# Patient Record
Sex: MALE | Race: BLACK OR AFRICAN AMERICAN | Employment: UNEMPLOYED | URBAN - METROPOLITAN AREA
[De-identification: names, ages, dates, MRNs, and addresses within clinical notes are randomized per-mention and may not be internally consistent; named-entity substitution may affect disease eponyms.]

---

## 2016-04-12 LAB — LDL-C, EXTERNAL: 105

## 2016-12-20 LAB — LDL-C, EXTERNAL: 127

## 2017-06-14 ENCOUNTER — OFFICE VISIT (OUTPATIENT)
Dept: UROLOGY | Age: 57
End: 2017-06-14

## 2017-06-14 VITALS
WEIGHT: 153 LBS | BODY MASS INDEX: 20.72 KG/M2 | DIASTOLIC BLOOD PRESSURE: 62 MMHG | HEIGHT: 72 IN | HEART RATE: 87 BPM | SYSTOLIC BLOOD PRESSURE: 98 MMHG | OXYGEN SATURATION: 98 %

## 2017-06-14 DIAGNOSIS — N52.02 CORPORO-VENOUS OCCLUSIVE ERECTILE DYSFUNCTION: ICD-10-CM

## 2017-06-14 DIAGNOSIS — C61 MALIGNANT NEOPLASM OF PROSTATE (HCC): Primary | ICD-10-CM

## 2017-06-14 LAB
BILIRUB UR QL STRIP: NEGATIVE
GLUCOSE UR-MCNC: NEGATIVE MG/DL
KETONES P FAST UR STRIP-MCNC: NEGATIVE MG/DL
PH UR STRIP: 5.5 [PH] (ref 4.6–8)
PROT UR QL STRIP: NEGATIVE MG/DL
SP GR UR STRIP: 1.02 (ref 1–1.03)
UA UROBILINOGEN AMB POC: NORMAL (ref 0.2–1)
URINALYSIS CLARITY POC: CLEAR
URINALYSIS COLOR POC: YELLOW
URINE BLOOD POC: NEGATIVE
URINE LEUKOCYTES POC: NEGATIVE
URINE NITRITES POC: NEGATIVE

## 2017-06-14 RX ORDER — TAMSULOSIN HYDROCHLORIDE 0.4 MG/1
CAPSULE ORAL
COMMUNITY
Start: 2017-05-03 | End: 2020-08-27 | Stop reason: ALTCHOICE

## 2017-06-14 RX ORDER — LOVASTATIN 20 MG/1
TABLET ORAL
COMMUNITY
Start: 2017-05-03

## 2017-06-14 RX ORDER — TADALAFIL 20 MG/1
20 TABLET ORAL AS NEEDED
Qty: 10 TAB | Refills: 10 | Status: SHIPPED | OUTPATIENT
Start: 2017-06-14

## 2017-06-14 NOTE — MR AVS SNAPSHOT
Visit Information Date & Time Provider Department Dept. Phone Encounter #  
 6/14/2017  9:30 AM Mae Beaulieu, Chris [place] [facility] [id number] Your Appointments 6/19/2017  9:15 AM  
[place] [facility] [facility]. [address] ([address]) Appt Note: Return in about 10 months (around 6/26/2017) for CaP s/p IMRT/brachytherapy 03/2016-06/2016, PSA prior. [address] [facility] [address] [id number]  
  
    
 6/27/2017  9:30 AM  
ESTABLISHED PATIENT with Lois Blackwood MD  
[facility]. [address] [facility]) Appt Note: Return in about 10 months (around 6/26/2017) for CaP s/p IMRT/brachytherapy 03/2016-06/2016, PSA prior. [address] [address]  
[phone]  
  
   
 [facility] [id number] Upcoming Health Maintenance Date Due Hepatitis C Screening 1960 Pneumococcal 19-64 Highest Risk (1 of 3 - PCV13) 3/21/1978 DTaP/Tdap/Td series (1 - Tdap) 3/21/1980 FOBT Q 1 YEAR AGE 50-75 3/21/2009 INFLUENZA AGE 9 TO ADULT 8/1/2017 Allergies as of 6/14/2017  Review Complete On: 6/14/2017 By: Mae Beaulieu MD  
 No Known Allergies Current Immunizations  Never Reviewed No immunizations on file. Not reviewed this visit You Were Diagnosed With   
  
 Codes Comments [facility]    -  Primary ICD-10-CM: V13 ICD-9-CM: 243 Vitals BP Pulse Height(growth percentile) Weight(growth percentile) SpO2 BMI  
 98/62 (BP 1 Location: Left arm, BP Patient Position: Sitting) 87 6' (1.829 m) 153 lb (69.4 kg) 98% 20.75 kg/m2 Smoking Status Current Some Day Smoker Vitals History BMI and BSA Data Body Mass Index Body Surface Area 20.75 kg/m 2 1.88 m 2 Preferred Pharmacy Pharmacy Name Phone [address] [facility], [address] Your Updated Medication List  
  
   
This list is accurate as of: 6/14/17 10:23 AM.  Always use your most recent med list.  
  
  
  
  
 lovastatin 20 mg tablet Commonly known as:  MEVACOR  
  
 sildenafil citrate 100 mg tablet Commonly known as:  VIAGRA Take 1 Tab by mouth daily as needed for up to 1 dose. Take one tablet po prior to relation on empty stomach. * tadalafil 20 mg tablet Commonly known as:  CIALIS Take 1 Tab by mouth daily as needed. * tadalafil 20 mg tablet Commonly known as:  CIALIS Take 1 Tab by mouth as needed. Indications: Erectile Dysfunction  
  
 tamsulosin 0.4 mg capsule Commonly known as:  FLOMAX * Notice: This list has 2 medication(s) that are the same as other medications prescribed for you. Read the directions carefully, and ask your doctor or other care provider to review them with you. Prescriptions Printed Refills  
 tadalafil (CIALIS) 20 mg tablet 10 Sig: Take 1 Tab by mouth as needed. Indications: Erectile Dysfunction Class: Print Route: Oral  
  
We Performed the Following AMB POC URINALYSIS DIP STICK AUTO W/O MICRO [23489 CPT(R)] ID COLLECTION VENOUS BLOOD,VENIPUNCTURE G5676349 CPT(R)] PROSTATE SPECIFIC AG (PSA) [id number] CPT(R)] Patient Instructions Prostate-Specific Antigen (PSA) Test: About This Test 
What is it? A prostate-specific antigen (PSA) test measures the amount of PSA in your blood. PSA is released by a man's prostate gland into his blood. A high PSA level may mean that you have an enlargement, infection, or cancer of the prostate. Why is this test done? You may have this test to: · Check for prostate cancer. · Watch prostate cancer and see if treatment is working.  
How can you prepare for the test? 
Do not ejaculate during the 2 days before your PSA blood test, either during sex or masturbation. What happens before the test? 
Tell your doctor if you have had a: 
· Test to look at your bladder (cystoscopy) in the past several weeks. · Prostate biopsy in the past several weeks. · Prostate infection or urinary tract infection that has not gone away. · Tube (catheter) inserted into your bladder to drain urine recently. What happens during the test? 
A health professional takes a sample of your blood. What happens after the test? 
You can go back to your usual activities right away. When should you call for help? Watch closely for changes in your health, and be sure to contact your doctor if you have any questions about this test. 
Follow-up care is a key part of your treatment and safety. Be sure to make and go to all appointments, and call your doctor if you are having problems. It's also a good idea to keep a list of the medicines you take. Ask your doctor when you can expect to have your test results. Where can you learn more? Go to http://ab-maximus.info/. Enter O922 in the search box to learn more about \"Prostate-Specific Antigen (PSA) Test: About This Test.\" Current as of: July 26, 2016 Content Version: 11.2 © 0811-3066 [employer]. Care instructions adapted under license by [employer] (which disclaims liability or warranty for this information). If you have questions about a medical condition or this instruction, always ask your healthcare professional. [place] [address] any warranty or liability for your use of this information. Introducing [facility]! Shoshana Anguiano introduces [employer] Path patient portal. Now you can access parts of your medical record, email your doctor's office, and request medication refills online. 1. In your internet browser, go to [email or web address] 2. Click on the First Time User? Click Here link in the Sign In box.  You will see the New Member Sign Up page. 3. Enter your LocBox Access Code exactly as it appears below. You will not need to use this code after youve completed the sign-up process. If you do not sign up before the expiration date, you must request a new code. · LocBox Access Code: [email or web address] Expires: 9/12/2017  9:25 AM 
 
4. Enter the last four digits of your Social Security Number (xxxx) and Date of Birth (mm/dd/yyyy) as indicated and click Submit. You will be taken to the next sign-up page. 5. Create a LocBox ID. This will be your LocBox login ID and cannot be changed, so think of one that is secure and easy to remember. 6. Create a LocBox password. You can change your password at any time. 7. Enter your Password Reset Question and Answer. This can be used at a later time if you forget your password. 8. Enter your e-mail address. You will receive e-mail notification when new information is available in [address]. 9. Click Sign Up. You can now view and download portions of your medical record. 10. Click the Download Summary menu link to download a portable copy of your medical information. If you have questions, please visit the Frequently Asked Questions section of the LocBox website. Remember, LocBox is NOT to be used for urgent needs. For medical emergencies, dial 911. Now available from your iPhone and Android! Please provide this summary of care documentation to your next provider. Your primary care clinician is listed as Fatmata Herring. If you have any questions after today's visit, please call [phone].

## 2017-06-14 NOTE — PROGRESS NOTES
RBV. Per Dr. Latrice Corey lab drawn in office today for PSA for prostate cancer. Mr. Tanisha Carrillo has a reminder for a \"due or due soon\" health maintenance. I have asked that he contact his primary care provider for follow-up on this health maintenance.

## 2017-06-14 NOTE — PATIENT INSTRUCTIONS
Prostate-Specific Antigen (PSA) Test: About This Test  What is it? A prostate-specific antigen (PSA) test measures the amount of PSA in your blood. PSA is released by a man's prostate gland into his blood. A high PSA level may mean that you have an enlargement, infection, or cancer of the prostate. Why is this test done? You may have this test to:  · Check for prostate cancer. · Watch prostate cancer and see if treatment is working. How can you prepare for the test?  Do not ejaculate during the 2 days before your PSA blood test, either during sex or masturbation. What happens before the test?  Tell your doctor if you have had a:  · Test to look at your bladder (cystoscopy) in the past several weeks. · Prostate biopsy in the past several weeks. · Prostate infection or urinary tract infection that has not gone away. · Tube (catheter) inserted into your bladder to drain urine recently. What happens during the test?  A health professional takes a sample of your blood. What happens after the test?  You can go back to your usual activities right away. When should you call for help? Watch closely for changes in your health, and be sure to contact your doctor if you have any questions about this test.  Follow-up care is a key part of your treatment and safety. Be sure to make and go to all appointments, and call your doctor if you are having problems. It's also a good idea to keep a list of the medicines you take. Ask your doctor when you can expect to have your test results. Where can you learn more? Go to http://ab-maximus.info/. Enter O675 in the search box to learn more about \"Prostate-Specific Antigen (PSA) Test: About This Test.\"  Current as of: July 26, 2016  Content Version: 11.2  © 7201-5425 [employer]. Care instructions adapted under license by [employer] (which disclaims liability or warranty for this information).  If you have questions about a medical condition or this instruction, always ask your healthcare professional. [place] [address] any warranty or liability for your use of this information.

## 2017-06-15 LAB — PSA SERPL-MCNC: 0.4 NG/ML (ref 0–4)
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
## 2017-12-06 ENCOUNTER — HOSPITAL ENCOUNTER (OUTPATIENT)
Dept: LAB | Age: 57
Discharge: HOME | End: 2017-12-06

## 2017-12-06 PROCEDURE — 99001 SPECIMEN HANDLING PT-LAB: CPT | Performed by: RADIOLOGY

## 2017-12-11 ENCOUNTER — HOSPITAL ENCOUNTER (OUTPATIENT)
Dept: RADIATION THERAPY | Age: 57
Discharge: HOME | End: 2017-12-11
Payer: MEDICAID

## 2017-12-11 PROCEDURE — 99211 OFF/OP EST MAY X REQ PHY/QHP: CPT

## 2018-02-13 ENCOUNTER — DOCUMENTATION ONLY (OUTPATIENT)
Dept: UROLOGY | Age: 58
End: 2018-02-13

## 2018-02-13 NOTE — PROGRESS NOTES
called and left message for me to call him. I returned the call no one answered left message for him to return his call.

## 2018-06-14 ENCOUNTER — OFFICE VISIT (OUTPATIENT)
Dept: UROLOGY | Age: 58
End: 2018-06-14

## 2018-06-14 VITALS
WEIGHT: 168 LBS | HEIGHT: 72 IN | BODY MASS INDEX: 22.75 KG/M2 | SYSTOLIC BLOOD PRESSURE: 118 MMHG | HEART RATE: 94 BPM | TEMPERATURE: 97.9 F | DIASTOLIC BLOOD PRESSURE: 60 MMHG | OXYGEN SATURATION: 98 %

## 2018-06-14 DIAGNOSIS — C61 MALIGNANT NEOPLASM OF PROSTATE (HCC): Primary | ICD-10-CM

## 2018-06-14 LAB
BILIRUB UR QL STRIP: NEGATIVE
GLUCOSE UR-MCNC: NEGATIVE MG/DL
KETONES P FAST UR STRIP-MCNC: NEGATIVE MG/DL
PH UR STRIP: 5 [PH] (ref 4.6–8)
PROT UR QL STRIP: NEGATIVE
SP GR UR STRIP: 1.02 (ref 1–1.03)
UA UROBILINOGEN AMB POC: NORMAL (ref 0.2–1)
URINALYSIS CLARITY POC: CLEAR
URINALYSIS COLOR POC: YELLOW
URINE BLOOD POC: NEGATIVE
URINE LEUKOCYTES POC: NEGATIVE
URINE NITRITES POC: NEGATIVE
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
## 2018-06-14 NOTE — PATIENT INSTRUCTIONS
Prostate Cancer: Care Instructions  Your Care Instructions    The prostate gland is a small, walnut-shaped organ that lies just below a man's bladder. It surrounds the urethra, the tube that carries urine from the bladder out of the body through the penis. Prostate cancer is the abnormal growth of cells in the prostate gland. Prostate cancer cells can spread within the prostate, to nearby lymph nodes and other tissues, and to other parts of the body. When the cancer hasn't spread outside the prostate, it is called localized prostate cancer. With localized prostate cancer, your options depend on how likely it is that your cancer will grow. Tests will show if your cancer is likely to grow. · Low-risk cancer isn't likely to grow right away. If your cancer is low-risk, you can choose active surveillance. This means your cancer will be watched closely by your doctor with regular checkups and tests to see if the cancer grows. This choice allows you to delay having surgery or radiation, often for many years. If the cancer grows very slowly, you may never need treatment. · Medium-risk cancer is more likely to grow. Some men with this type of cancer may be able to choose active surveillance. Others may need to choose surgery or radiation. · High-risk cancer is most likely to grow. If you have high-risk cancer, you will likely need to choose surgery or radiation. If your cancer has already spread outside the prostate or to other parts of the body, then you may have other treatments, like chemotherapy or hormone therapy. If you are over age [de-identified] or have other serious health problems, like heart disease, you may choose not to have treatments to cure your cancer. Instead, you can just have treatments to manage your symptoms. This is called watchful waiting. Finding out that you have cancer is scary. You may feel many emotions and may need some help coping. Seek out family, friends, and counselors for support.  You also can do things at home to make yourself feel better while you go through treatment. Call the Karin Morrissey (8-339.374.7688) or visit its website at [address] Source Audio for more information. Follow-up care is a key part of your treatment and safety. Be sure to make and go to all appointments, and call your doctor if you are having problems. It's also a good idea to know your test results and keep a list of the medicines you take. How can you care for yourself at home? · Your doctor will talk to you about your treatment options. You may need to learn more about each of them before you can decide which treatment is best for you. · Take your medicines exactly as prescribed. Call your doctor if you think you are having a problem with your medicine. You will get more details on the specific medicines your doctor prescribes. · Eat healthy food. If you do not feel like eating, try to eat food that has protein and extra calories to keep up your strength and prevent weight loss. Drink liquid meal replacements for extra calories and protein. Try to eat your main meal early. · Take steps to control your stress and workload. Learn relaxation techniques. ¨ Share your feelings. Stress and tension affect our emotions. By expressing your feelings to others, you may be able to understand and cope with them. ¨ Consider joining a support group. Talking about a problem with your spouse, a good friend, or other people with similar problems is a good way to reduce tension and stress. ¨ Express yourself through art. Try writing, crafts, dance, or art to relieve stress. Some dance, writing, or art groups may be available just for people who have cancer. ¨ Be kind to your body and mind. Getting enough sleep, eating a healthy diet, and taking time to do things you enjoy can contribute to an overall feeling of balance in your life and can help reduce stress. ¨ Get help if you need it.  Discuss your concerns with your doctor or counselor. · Get some physical activity every day, but do not get too tired. Keep doing the hobbies you enjoy as your energy allows. · If you are vomiting or have diarrhea:  ¨ Drink plenty of fluids (enough so that your urine is light yellow or clear like water) to prevent dehydration. Choose water and other caffeine-free clear liquids. If you have kidney, heart, or liver disease and have to limit fluids, talk with your doctor before you increase the amount of fluids you drink. ¨ When you are able to eat, try clear soups, mild foods, and liquids until all symptoms are gone for 12 to 48 hours. Jell-O, dry toast, crackers, and cooked cereal are also good choices. · If you have not already done so, prepare a list of advance directives. Advance directives are instructions to your doctor and family members about what kind of care you want if you become unable to speak or express yourself. When should you call for help? Call 911 anytime you think you may need emergency care. For example, call if:  ? · You passed out (lost consciousness). ?Call your doctor now or seek immediate medical care if:  ? · You have new or worse pain. ? · You have new symptoms, such as a cough, belly pain, vomiting, diarrhea, or a rash. ? · You have symptoms of a urinary tract infection. For example:  ¨ You have blood or pus in your urine. ¨ You have pain in your back just below your rib cage. This is called flank pain. ¨ You have a fever, chills, or body aches. ¨ It hurts to urinate. ¨ You have groin or belly pain. ? Watch closely for changes in your health, and be sure to contact your doctor if:  ? · You have swollen glands in your armpits, groin, or neck. ? · You have trouble controlling your urine. ? · You do not get better as expected. Where can you learn more? Go to http://ab-maximus.info/. Enter V141 in the search box to learn more about \"Prostate Cancer: Care Instructions. \"  Current as of:  May 12, 2017  Content Version: 11.4  © 1973-6843 Healthwise[employer]. Care instructions adapted under license by [employer] (which disclaims liability or warranty for this information). If you have questions about a medical condition or this instruction, always ask your healthcare professional. [address] any warranty or liability for your use of this information.

## 2018-06-14 NOTE — MR AVS SNAPSHOT
[address] [facility] [address] [address] 
[phone] Patient: Jared Stokes MRN: [id number] FDA:5/01/[birth year]42 Visit Information Date & Time Provider Department Dept. Phone Encounter #  
 6/14/2018  9:30 AM Rashida Mcdaniel, [address] [id number] Your Appointments 6/13/2019  9:45 AM  
Office Visit with Rashida Mcdaniel MD  
[facility] [address] [facility]) Appt Note: PSA  
 [address] [place] [address] [address]  
[phone] [address] [place] [address] Upcoming Health Maintenance Date Due Hepatitis C Screening 1960 Pneumococcal 19-64 Highest Risk (1 of 3 - PCV13) 3/21/1978 DTaP/Tdap/Td series (1 - Tdap) 3/21/1980 FOBT Q 1 YEAR AGE 50-75 3/21/2009 Influenza Age 5 to Adult 8/1/2018 Allergies as of 6/14/2018  Review Complete On: 6/14/2018 By: Rashida Mcdaniel MD  
 No Known Allergies Current Immunizations  Never Reviewed No immunizations on file. Not reviewed this visit You Were Diagnosed With   
  
 Codes Comments Malignant neoplasm of prostate ([facility]    -  Primary ICD-10-CM: U29 ICD-9-CM: 668 Vitals BP Pulse Temp Height(growth percentile) Weight(growth percentile) SpO2  
 118/60 (BP 1 Location: Left arm, BP Patient Position: Sitting) 94 97.9 °F (36.6 °C) (Oral) 6' (1.829 m) 168 lb (76.2 kg) 98% BMI Smoking Status 22.78 kg/m2 Current Some Day Smoker BMI and BSA Data Body Mass Index Body Surface Area 22.78 kg/m 2 1.97 m 2 Preferred Pharmacy Pharmacy Name Phone [address] [facility], [address] Your Updated Medication List  
  
   
This list is accurate as of 6/14/18 10:44 AM.  Always use your most recent med list.  
  
  
  
  
 lovastatin 20 mg tablet Commonly known as:  MEVACOR  
  
 sildenafil citrate 100 mg tablet Commonly known as:  VIAGRA Take 1 Tab by mouth daily as needed for up to 1 dose. Take one tablet po prior to relation on empty stomach. * tadalafil 20 mg tablet Commonly known as:  CIALIS Take 1 Tab by mouth daily as needed. * tadalafil 20 mg tablet Commonly known as:  CIALIS Take 1 Tab by mouth as needed. Indications: Erectile Dysfunction  
  
 tamsulosin 0.4 mg capsule Commonly known as:  FLOMAX * Notice: This list has 2 medication(s) that are the same as other medications prescribed for you. Read the directions carefully, and ask your doctor or other care provider to review them with you. We Performed the Following AMB POC URINALYSIS DIP STICK AUTO W/O MICRO [53017 CPT(R)] COLLECTION VENOUS BLOOD,VENIPUNCTURE [id number] CPT(R)] PSA, DIAGNOSTIC (PROSTATE SPECIFIC AG) [id number] CPT(R)] To-Do List   
 12/14/2018 10:00 AM  
  Appointment with [facility] RADIATION ONCOLOGY at [facility]ERAP[facility] ([phone]) ATTENTION: The Appointment Time in [address] may not reflect your scheduled appointment time as the time is only a place murphy. If you have any questions about your appointment time, please call [facility] / [address] [place] [address] at [phone]. Patient Instructions Prostate Cancer: Care Instructions Your Care Instructions The prostate gland is a small, walnut-shaped organ that lies just below a man's bladder. It surrounds the urethra, the tube that carries urine from the bladder out of the body through the penis. Prostate cancer is the abnormal growth of cells in the prostate gland. Prostate cancer cells can spread within the prostate, to nearby lymph nodes and other tissues, and to other parts of the body. When the cancer hasn't spread outside the prostate, it is called localized prostate cancer.  With localized prostate cancer, your options depend on how likely it is that your cancer will grow. Tests will show if your cancer is likely to grow. · Low-risk cancer isn't likely to grow right away. If your cancer is low-risk, you can choose active surveillance. This means your cancer will be watched closely by your doctor with regular checkups and tests to see if the cancer grows. This choice allows you to delay having surgery or radiation, often for many years. If the cancer grows very slowly, you may never need treatment. · Medium-risk cancer is more likely to grow. Some men with this type of cancer may be able to choose active surveillance. Others may need to choose surgery or radiation. · High-risk cancer is most likely to grow. If you have high-risk cancer, you will likely need to choose surgery or radiation. If your cancer has already spread outside the prostate or to other parts of the body, then you may have other treatments, like chemotherapy or hormone therapy. If you are over age [de-identified] or have other serious health problems, like heart disease, you may choose not to have treatments to cure your cancer. Instead, you can just have treatments to manage your symptoms. This is called watchful waiting. Finding out that you have cancer is scary. You may feel many emotions and may need some help coping. Seek out family, friends, and counselors for support. You also can do things at home to make yourself feel better while you go through treatment. Call the [facility] ([phone]) or visit its website at [address] [employer] for more information. Follow-up care is a key part of your treatment and safety. Be sure to make and go to all appointments, and call your doctor if you are having problems. It's also a good idea to know your test results and keep a list of the medicines you take. How can you care for yourself at home? · Your doctor will talk to you about your treatment options.  You may need to learn more about each of them before you can decide which treatment is best for you. · Take your medicines exactly as prescribed. Call your doctor if you think you are having a problem with your medicine. You will get more details on the specific medicines your doctor prescribes. · Eat healthy food. If you do not feel like eating, try to eat food that has protein and extra calories to keep up your strength and prevent weight loss. Drink liquid meal replacements for extra calories and protein. Try to eat your main meal early. · Take steps to control your stress and workload. Learn relaxation techniques. ¨ Share your feelings. Stress and tension affect our emotions. By expressing your feelings to others, you may be able to understand and cope with them. ¨ Consider joining a support group. Talking about a problem with your spouse, a good friend, or other people with similar problems is a good way to reduce tension and stress. ¨ Express yourself through art. Try writing, crafts, dance, or art to relieve stress. Some dance, writing, or art groups may be available just for people who have cancer. ¨ Be kind to your body and mind. Getting enough sleep, eating a healthy diet, and taking time to do things you enjoy can contribute to an overall feeling of balance in your life and can help reduce stress. ¨ Get help if you need it. Discuss your concerns with your doctor or counselor. · Get some physical activity every day, but do not get too tired. Keep doing the hobbies you enjoy as your energy allows. · If you are vomiting or have diarrhea: ¨ Drink plenty of fluids (enough so that your urine is light yellow or clear like water) to prevent dehydration. Choose water and other caffeine-free clear liquids. If you have kidney, heart, or liver disease and have to limit fluids, talk with your doctor before you increase the amount of fluids you drink. ¨ When you are able to eat, try clear soups, mild foods, and liquids until all symptoms are gone for 12 to 48 hours. Jell-O, dry toast, crackers, and cooked cereal are also good choices. · If you have not already done so, prepare a list of advance directives. Advance directives are instructions to your doctor and family members about what kind of care you want if you become unable to speak or express yourself. When should you call for help? Call 911 anytime you think you may need emergency care. For example, call if: 
? · You passed out (lost consciousness). ?Call your doctor now or seek immediate medical care if: 
? · You have new or worse pain. ? · You have new symptoms, such as a cough, belly pain, vomiting, diarrhea, or a rash. ? · You have symptoms of a urinary tract infection. For example: ¨ You have blood or pus in your urine. ¨ You have pain in your back just below your rib cage. This is called flank pain. ¨ You have a fever, chills, or body aches. ¨ It hurts to urinate. ¨ You have groin or belly pain. ? Watch closely for changes in your health, and be sure to contact your doctor if: 
? · You have swollen glands in your armpits, groin, or neck. ? · You have trouble controlling your urine. ? · You do not get better as expected. Where can you learn more? Go to http://ab-maximus.info/. Enter V141 in the search box to learn more about \"Prostate Cancer: Care Instructions. \" Current as of: May 12, 2017 Content Version: 11.4 © 4411-8317 [employer]. Care instructions adapted under license by [employer] (which disclaims liability or warranty for this information). If you have questions about a medical condition or this instruction, always ask your healthcare professional. [place] [address] any warranty or liability for your use of this information. Introducing [facility] SERVICES! Oliver Hadley introduces CupomNow patient portal. Now you can access parts of your medical record, email your doctor's office, and request medication refills online. 1. In your internet browser, go to [email or web address]. [email or web address]/Retail Info 2. Click on the First Time User? Click Here link in the Sign In box. You will see the New Member Sign Up page. 3. Enter your CupomNow Access Code exactly as it appears below. You will not need to use this code after youve completed the sign-up process. If you do not sign up before the expiration date, you must request a new code. · CupomNow Access Code: [email or web address] Expires: 9/12/2018 10:44 AM 
 
4. Enter the last four digits of your Social Security Number (xxxx) and Date of Birth (mm/dd/yyyy) as indicated and click Submit. You will be taken to the next sign-up page. 5. Create a CupomNow ID. This will be your CupomNow login ID and cannot be changed, so think of one that is secure and easy to remember. 6. Create a CupomNow password. You can change your password at any time. 7. Enter your Password Reset Question and Answer. This can be used at a later time if you forget your password. 8. Enter your e-mail address. You will receive e-mail notification when new information is available in [address] 9. Click Sign Up. You can now view and download portions of your medical record. 10. Click the Download Summary menu link to download a portable copy of your medical information. If you have questions, please visit the Frequently Asked Questions section of the CupomNow website. Remember, CupomNow is NOT to be used for urgent needs. For medical emergencies, dial 911. Now available from your iPhone and Android! Please provide this summary of care documentation to your next provider. Your primary care clinician is listed as Elizabeth Andrews. If you have any questions after today's visit, please call [phone].

## 2018-06-15 LAB — PSA SERPL-MCNC: <0.1 NG/ML (ref 0–4)

## 2018-08-30 ENCOUNTER — OFFICE VISIT (OUTPATIENT)
Dept: CARDIOLOGY CLINIC | Age: 58
End: 2018-08-30

## 2018-08-30 VITALS
HEART RATE: 89 BPM | HEIGHT: 72 IN | SYSTOLIC BLOOD PRESSURE: 109 MMHG | DIASTOLIC BLOOD PRESSURE: 61 MMHG | WEIGHT: 159 LBS | BODY MASS INDEX: 21.54 KG/M2

## 2018-08-30 DIAGNOSIS — R07.9 CHEST PAIN, UNSPECIFIED TYPE: Primary | ICD-10-CM

## 2018-08-30 DIAGNOSIS — R06.02 SHORTNESS OF BREATH: ICD-10-CM

## 2018-08-30 DIAGNOSIS — F17.200 SMOKER: ICD-10-CM

## 2018-08-30 DIAGNOSIS — E78.5 DYSLIPIDEMIA: ICD-10-CM

## 2018-08-30 NOTE — PATIENT INSTRUCTIONS
Chest Pain: Care Instructions  Your Care Instructions    There are many things that can cause chest pain. Some are not serious and will get better on their own in a few days. But some kinds of chest pain need more testing and treatment. Your doctor may have recommended a follow-up visit in the next 8 to 12 hours. If you are not getting better, you may need more tests or treatment. Even though your doctor has released you, you still need to watch for any problems. The doctor carefully checked you, but sometimes problems can develop later. If you have new symptoms or if your symptoms do not get better, get medical care right away. If you have worse or different chest pain or pressure that lasts more than 5 minutes or you passed out (lost consciousness), call 911 or seek other emergency help right away. A medical visit is only one step in your treatment. Even if you feel better, you still need to do what your doctor recommends, such as going to all suggested follow-up appointments and taking medicines exactly as directed. This will help you recover and help prevent future problems. How can you care for yourself at home? · Rest until you feel better. · Take your medicine exactly as prescribed. Call your doctor if you think you are having a problem with your medicine. · Do not drive after taking a prescription pain medicine. When should you call for help? Call 911 if:    · You passed out (lost consciousness).     · You have severe difficulty breathing.     · You have symptoms of a heart attack. These may include:  ¨ Chest pain or pressure, or a strange feeling in your chest.  ¨ Sweating. ¨ Shortness of breath. ¨ Nausea or vomiting. ¨ Pain, pressure, or a strange feeling in your back, neck, jaw, or upper belly or in one or both shoulders or arms. ¨ Lightheadedness or sudden weakness. ¨ A fast or irregular heartbeat.   After you call 911, the  may tell you to chew 1 adult-strength or 2 to 4 low-dose aspirin. Wait for an ambulance. Do not try to drive yourself.    Call your doctor today if:    · You have any trouble breathing.     · Your chest pain gets worse.     · You are dizzy or lightheaded, or you feel like you may faint.     · You are not getting better as expected.     · You are having new or different chest pain. Where can you learn more? Go to http://ab-maximus.info/. Enter A120 in the search box to learn more about \"Chest Pain: Care Instructions. \"  Current as of: November 20, 2017  Content Version: 11.7  © 1873-3201 [employer]. Care instructions adapted under license by [employer] (which disclaims liability or warranty for this information). If you have questions about a medical condition or this instruction, always ask your healthcare professional. [address] any warranty or liability for your use of this information. Chest Pain: Care Instructions  Your Care Instructions    There are many things that can cause chest pain. Some are not serious and will get better on their own in a few days. But some kinds of chest pain need more testing and treatment. Your doctor may have recommended a follow-up visit in the next 8 to 12 hours. If you are not getting better, you may need more tests or treatment. Even though your doctor has released you, you still need to watch for any problems. The doctor carefully checked you, but sometimes problems can develop later. If you have new symptoms or if your symptoms do not get better, get medical care right away. If you have worse or different chest pain or pressure that lasts more than 5 minutes or you passed out (lost consciousness), call 911 or seek other emergency help right away. A medical visit is only one step in your treatment.  Even if you feel better, you still need to do what your doctor recommends, such as going to all suggested follow-up appointments and taking medicines exactly as directed. This will help you recover and help prevent future problems. How can you care for yourself at home? · Rest until you feel better. · Take your medicine exactly as prescribed. Call your doctor if you think you are having a problem with your medicine. · Do not drive after taking a prescription pain medicine. When should you call for help? Call 911 if:    · You passed out (lost consciousness).     · You have severe difficulty breathing.     · You have symptoms of a heart attack. These may include:  ¨ Chest pain or pressure, or a strange feeling in your chest.  ¨ Sweating. ¨ Shortness of breath. ¨ Nausea or vomiting. ¨ Pain, pressure, or a strange feeling in your back, neck, jaw, or upper belly or in one or both shoulders or arms. ¨ Lightheadedness or sudden weakness. ¨ A fast or irregular heartbeat. After you call 911, the  may tell you to chew 1 adult-strength or 2 to 4 low-dose aspirin. Wait for an ambulance. Do not try to drive yourself.    Call your doctor today if:    · You have any trouble breathing.     · Your chest pain gets worse.     · You are dizzy or lightheaded, or you feel like you may faint.     · You are not getting better as expected.     · You are having new or different chest pain. Where can you learn more? Go to http://ab-maximus.info/. Enter A120 in the search box to learn more about \"Chest Pain: Care Instructions. \"  Current as of: November 20, 2017  Content Version: 11.7  © 9083-7120 [employer]. Care instructions adapted under license by [employer] (which disclaims liability or warranty for this information). If you have questions about a medical condition or this instruction, always ask your healthcare professional. [address] any warranty or liability for your use of this information.

## 2018-08-30 NOTE — MR AVS SNAPSHOT
Angelito Ogden 
 
 
 [address] [facility] 
[phone] Patient: Baylee Pitt MRN: [id number] BLZ:[id number]/31/[id number] Visit Information Date & Time Provider Department Dept. Phone Encounter #  
 8/30/2018  9:15 AM Tucker Mccarthy MD [facility] [phone] Follow-up Instructions Return in about 2 weeks (around 9/13/2018). Your Appointments 10/4/2018  1:30 PM  
PROCEDURE with CA ECHO [facility] ([address]) Appt Note: stress echo/yesenia; stress echo/yesenia [address] [facility] [address]  
  
   
 [address] [id number] [address] [address]  
  
    
 10/10/2018 11:30 AM  
Office Visit with Tucker Mccarthy MD  
[facility] ([facility]) Appt Note: Post stress echo  
 [address] [facility] [address]  
  
   
 [address] [id number] [address] [address] 6/13/2019  9:45 AM  
Office Visit with Cassandra Hodges MD  
[facility] [address] [place] [address]) Appt Note: PSA  
 [address] [facility] [address] [address]  
[phone] [address] [facility] [address] [place] [address] Upcoming Health Maintenance Date Due Hepatitis C Screening 1960 Pneumococcal 19-64 Highest Risk (1 of 3 - PCV13) 3/21/1978 DTaP/Tdap/Td series (1 - Tdap) 3/21/1980 FOBT Q 1 YEAR AGE 50-75 3/21/2009 Influenza Age 5 to Adult 8/1/2018 Allergies as of 8/30/2018  Review Complete On: 8/30/2018 By: Sinai Miranda No Known Allergies Current Immunizations  Never Reviewed No immunizations on file. Not reviewed this visit You Were Diagnosed With   
  
 Codes Comments Chest pain, unspecified type    -  Primary ICD-10-CM: R07.9 ICD-9-CM: 786.50 possible angina Shortness of breath     ICD-10-CM: R06.02 
ICD-9-CM: 786.05 Smoker     ICD-10-CM: U48.506 ICD-9-CM: 305.1 Dyslipidemia     ICD-10-CM: E78.5 ICD-9-CM: 272.4 Vitals BP Pulse Height(growth percentile) Weight(growth percentile) BMI Smoking Status 109/61 89 6' (1.829 m) 159 lb (72.1 kg) 21.56 kg/m2 Current Some Day Smoker Vitals History BMI and BSA Data Body Mass Index Body Surface Area  
 21.56 kg/m 2 1.91 m 2 Preferred Pharmacy Pharmacy Name Phone [address] [facility] [address] [facility], [address] [facility] Your Updated Medication List  
  
   
This list is accurate as of 8/30/18 10:32 AM.  Always use your most recent med list.  
  
  
  
  
 lovastatin 20 mg tablet Commonly known as:  MEVACOR  
  
 sildenafil citrate 100 mg tablet Commonly known as:  VIAGRA Take 1 Tab by mouth daily as needed for up to 1 dose. Take one tablet po prior to relation on empty stomach. tadalafil 20 mg tablet Commonly known as:  CIALIS Take 1 Tab by mouth as needed. Indications: Erectile Dysfunction  
  
 tamsulosin 0.4 mg capsule Commonly known as:  FLOMAX We Performed the Following AMB POC EKG ROUTINE W/ 12 LEADS, INTER & REP [08345 CPT(R)] Follow-up Instructions Return in about 2 weeks (around 9/13/2018). To-Do List   
 08/30/2018 Echocardiography:  ECHO STRESS   
  
 12/14/2018 10:00 AM  
  Appointment with [facility] at [facility] ([phone]) ATTENTION: The Appointment Time in [facility] may not reflect your scheduled appointment time as the time is only a place murphy. If you have any questions about your appointment time, please call [facility] / [address] [place] [address] at [phone]. Patient Instructions Chest Pain: Care Instructions Your Care Instructions There are many things that can cause chest pain. Some are not serious and will get better on their own in a few days.  But some kinds of chest pain need more testing and treatment. Your doctor may have recommended a follow-up visit in the next 8 to 12 hours. If you are not getting better, you may need more tests or treatment. Even though your doctor has released you, you still need to watch for any problems. The doctor carefully checked you, but sometimes problems can develop later. If you have new symptoms or if your symptoms do not get better, get medical care right away. If you have worse or different chest pain or pressure that lasts more than 5 minutes or you passed out (lost consciousness), call 911 or seek other emergency help right away. A medical visit is only one step in your treatment. Even if you feel better, you still need to do what your doctor recommends, such as going to all suggested follow-up appointments and taking medicines exactly as directed. This will help you recover and help prevent future problems. How can you care for yourself at home? · Rest until you feel better. · Take your medicine exactly as prescribed. Call your doctor if you think you are having a problem with your medicine. · Do not drive after taking a prescription pain medicine. When should you call for help? Call 911 if: 
  · You passed out (lost consciousness).  
  · You have severe difficulty breathing.  
  · You have symptoms of a heart attack. These may include: ¨ Chest pain or pressure, or a strange feeling in your chest. 
¨ Sweating. ¨ Shortness of breath. ¨ Nausea or vomiting. ¨ Pain, pressure, or a strange feeling in your back, neck, jaw, or upper belly or in one or both shoulders or arms. ¨ Lightheadedness or sudden weakness. ¨ A fast or irregular heartbeat. After you call 911, the  may tell you to chew 1 adult-strength or 2 to 4 low-dose aspirin. Wait for an ambulance. Do not try to drive yourself.  
 Call your doctor today if: 
  · You have any trouble breathing.  
  · Your chest pain gets worse.   · You are dizzy or lightheaded, or you feel like you may faint.  
  · You are not getting better as expected.  
  · You are having new or different chest pain. Where can you learn more? Go to http://ab-maximus.info/. Enter A120 in the search box to learn more about \"Chest Pain: Care Instructions. \" Current as of: November 20, 2017 Content Version: 11.7 © 4571-6233 [employer]. Care instructions adapted under license by [employer] (which disclaims liability or warranty for this information). If you have questions about a medical condition or this instruction, always ask your healthcare professional. [address] any warranty or liability for your use of this information. Chest Pain: Care Instructions Your Care Instructions There are many things that can cause chest pain. Some are not serious and will get better on their own in a few days. But some kinds of chest pain need more testing and treatment. Your doctor may have recommended a follow-up visit in the next 8 to 12 hours. If you are not getting better, you may need more tests or treatment. Even though your doctor has released you, you still need to watch for any problems. The doctor carefully checked you, but sometimes problems can develop later. If you have new symptoms or if your symptoms do not get better, get medical care right away. If you have worse or different chest pain or pressure that lasts more than 5 minutes or you passed out (lost consciousness), call 911 or seek other emergency help right away. A medical visit is only one step in your treatment. Even if you feel better, you still need to do what your doctor recommends, such as going to all suggested follow-up appointments and taking medicines exactly as directed. This will help you recover and help prevent future problems. How can you care for yourself at home? · Rest until you feel better. · Take your medicine exactly as prescribed. Call your doctor if you think you are having a problem with your medicine. · Do not drive after taking a prescription pain medicine. When should you call for help? Call 911 if: 
  · You passed out (lost consciousness).  
  · You have severe difficulty breathing.  
  · You have symptoms of a heart attack. These may include: ¨ Chest pain or pressure, or a strange feeling in your chest. 
¨ Sweating. ¨ Shortness of breath. ¨ Nausea or vomiting. ¨ Pain, pressure, or a strange feeling in your back, neck, jaw, or upper belly or in one or both shoulders or arms. ¨ Lightheadedness or sudden weakness. ¨ A fast or irregular heartbeat. After you call 911, the  may tell you to chew 1 adult-strength or 2 to 4 low-dose aspirin. Wait for an ambulance. Do not try to drive yourself.  
 Call your doctor today if: 
  · You have any trouble breathing.  
  · Your chest pain gets worse.  
  · You are dizzy or lightheaded, or you feel like you may faint.  
  · You are not getting better as expected.  
  · You are having new or different chest pain. Where can you learn more? Go to http://ab-maximus.info/. Enter A120 in the search box to learn more about \"Chest Pain: Care Instructions. \" Current as of: November 20, 2017 Content Version: 11.7 © 2446-1456 [employer]. Care instructions adapted under license by [employer] (which disclaims liability or warranty for this information). If you have questions about a medical condition or this instruction, always ask your healthcare professional. [address] any warranty or liability for your use of this information. Introducing [facility]! Alecia Zamarripa introduces [employer] patient portal. Now you can access parts of your medical record, email your doctor's office, and request medication refills online.    
 
1. In your internet browser, go to [email or web address]. [employer]/Silent Circlehart 2. Click on the First Time User? Click Here link in the Sign In box. You will see the New Member Sign Up page. 3. Enter your Zadby Access Code exactly as it appears below. You will not need to use this code after youve completed the sign-up process. If you do not sign up before the expiration date, you must request a new code. · Zadby Access Code: [email or web address] Expires: 9/12/2018 10:44 AM 
 
4. Enter the last four digits of your Social Security Number (xxxx) and Date of Birth (mm/dd/yyyy) as indicated and click Submit. You will be taken to the next sign-up page. 5. Create a Neurotrackt ID. This will be your Zadby login ID and cannot be changed, so think of one that is secure and easy to remember. 6. Create a Zadby password. You can change your password at any time. 7. Enter your Password Reset Question and Answer. This can be used at a later time if you forget your password. 8. Enter your e-mail address. You will receive e-mail notification when new information is available in [address] 9. Click Sign Up. You can now view and download portions of your medical record. 10. Click the Download Summary menu link to download a portable copy of your medical information. If you have questions, please visit the Frequently Asked Questions section of the Zadby website. Remember, Zadby is NOT to be used for urgent needs. For medical emergencies, dial 911. Now available from your iPhone and Android! Please provide this summary of care documentation to your next provider. Your primary care clinician is listed as Yannick Espinoza. If you have any questions after today's visit, please call [phone].
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
## 2018-12-14 ENCOUNTER — HOSPITAL ENCOUNTER (OUTPATIENT)
Dept: LAB | Age: 58
Discharge: HOME | End: 2018-12-14

## 2018-12-14 LAB — XX-LABCORP SPECIMEN COL,LCBCF: NORMAL

## 2018-12-14 PROCEDURE — 99001 SPECIMEN HANDLING PT-LAB: CPT

## 2018-12-19 ENCOUNTER — HOSPITAL ENCOUNTER (OUTPATIENT)
Dept: RADIATION THERAPY | Age: 58
Discharge: HOME | End: 2018-12-19
Payer: MEDICAID

## 2018-12-19 PROCEDURE — 99211 OFF/OP EST MAY X REQ PHY/QHP: CPT

## 2019-06-13 ENCOUNTER — OFFICE VISIT (OUTPATIENT)
Dept: UROLOGY | Age: 59
End: 2019-06-13

## 2019-06-13 VITALS
HEIGHT: 72 IN | SYSTOLIC BLOOD PRESSURE: 110 MMHG | DIASTOLIC BLOOD PRESSURE: 66 MMHG | HEART RATE: 113 BPM | OXYGEN SATURATION: 96 % | WEIGHT: 152 LBS | BODY MASS INDEX: 20.59 KG/M2

## 2019-06-13 DIAGNOSIS — N40.1 BPH WITH OBSTRUCTION/LOWER URINARY TRACT SYMPTOMS: ICD-10-CM

## 2019-06-13 DIAGNOSIS — C61 PROSTATE CANCER (HCC): Primary | ICD-10-CM

## 2019-06-13 DIAGNOSIS — N13.8 BPH WITH OBSTRUCTION/LOWER URINARY TRACT SYMPTOMS: ICD-10-CM

## 2019-06-13 LAB
BILIRUB UR QL STRIP: NEGATIVE
GLUCOSE UR-MCNC: NEGATIVE MG/DL
KETONES P FAST UR STRIP-MCNC: NEGATIVE MG/DL
PH UR STRIP: 5 [PH] (ref 4.6–8)
PROT UR QL STRIP: NEGATIVE
SP GR UR STRIP: 1.01 (ref 1–1.03)
UA UROBILINOGEN AMB POC: NORMAL (ref 0.2–1)
URINALYSIS CLARITY POC: CLEAR
URINALYSIS COLOR POC: YELLOW
URINE BLOOD POC: NEGATIVE
URINE LEUKOCYTES POC: NEGATIVE
URINE NITRITES POC: NEGATIVE

## 2019-06-13 RX ORDER — TAMSULOSIN HYDROCHLORIDE 0.4 MG/1
0.4 CAPSULE ORAL DAILY
Qty: 90 CAP | Refills: 3 | Status: SHIPPED | OUTPATIENT
Start: 2019-06-13

## 2019-06-13 NOTE — PATIENT INSTRUCTIONS
Prostate Cancer Screening: Care Instructions Your Care Instructions The prostate gland is an organ found just below a man's bladder. It is the size and shape of a walnut. It surrounds the tube that carries urine from the bladder out of the body through the penis. This tube is called the urethra. Prostate cancer is the abnormal growth of cells in the prostate. It is the second most common type of cancer in men. (Skin cancer is the most common.) Most cases of prostate cancer occur in men older than 72. The disease runs in families. And it's more common in -American men. When it's found and treated early, prostate cancer may be cured. But it is not always treated. This is because prostate cancer may not shorten your life, especially if you are older and the cancer is growing slowly. Follow-up care is a key part of your treatment and safety. Be sure to make and go to all appointments, and call your doctor if you are having problems. It's also a good idea to know your test results and keep a list of the medicines you take. What are the screening tests for prostate cancer? The main screening test for prostate cancer is the prostate-specific antigen (PSA) test. This is a blood test that measures how much PSA is in your blood. A high level may mean that you have an enlargement, an infection, or cancer. Along with the PSA test, you may have a digital rectal exam. The digital (finger) rectal exam checks for anything abnormal in your prostate. To do the exam, the doctor puts a lubricated, gloved finger into your rectum. If these tests suggest cancer, you may need a prostate biopsy. How is prostate cancer diagnosed? In a biopsy, the doctor takes small tissue samples from your prostate gland. Another doctor then looks at the tissue under a microscope to see if there are cancer cells, signs of infection, or other problems. The results help diagnose prostate cancer. What are the pros and cons of screening? Neither a PSA test nor a digital rectal exam can tell you for sure that you do or do not have cancer. But they can help you decide if you need more tests, such as a prostate biopsy. Screening tests may be useful because most men with prostate cancer don't have symptoms. It can be hard to know if you have cancer until it is more advanced. And then it's harder to treat. But having a PSA test can also cause harm. The test may show high levels of PSA that aren't caused by cancer. So you could have a prostate biopsy you didn't need. Or the PSA test might be normal when there is cancer, so a cancer might not be found early. The test can also find cancers that would never have caused a problem during your lifetime. So you might have treatment that was not needed. Prostate cancer usually develops late in life and grows slowly. For many men, it does not shorten their lives. Some experts advise screening only for men who are at high risk. Talk with your doctor to see if screening is right for you. Where can you learn more? Go to http://ab-maximus.info/. Enter R550 in the search box to learn more about \"Prostate Cancer Screening: Care Instructions. \" Current as of: March 27, 2018 Content Version: 11.9 © 7021-8097 [employer]. Care instructions adapted under license by [employer] (which disclaims liability or warranty for this information). If you have questions about a medical condition or this instruction, always ask your healthcare professional. [place] [address] any warranty or liability for your use of this information.
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
## 2019-06-13 NOTE — PROGRESS NOTES
Mr. Kymberly Villanueva has a reminder for a \"due or due soon\" health maintenance. I have asked that he contact his primary care provider for follow-up on this health maintenance.

## 2019-06-14 LAB — PSA SERPL-MCNC: <0.1 NG/ML (ref 0–4)

## 2019-06-19 DIAGNOSIS — C61 MALIGNANT NEOPLASM OF PROSTATE (HCC): ICD-10-CM

## 2020-08-27 ENCOUNTER — OFFICE VISIT (OUTPATIENT)
Dept: ORTHOPEDIC SURGERY | Age: 60
End: 2020-08-27

## 2020-08-27 VITALS
WEIGHT: 142 LBS | OXYGEN SATURATION: 97 % | DIASTOLIC BLOOD PRESSURE: 64 MMHG | HEART RATE: 90 BPM | BODY MASS INDEX: 19.23 KG/M2 | SYSTOLIC BLOOD PRESSURE: 103 MMHG | TEMPERATURE: 97.1 F | HEIGHT: 72 IN | RESPIRATION RATE: 16 BRPM

## 2020-08-27 DIAGNOSIS — M70.61 TROCHANTERIC BURSITIS OF RIGHT HIP: Primary | ICD-10-CM

## 2020-08-27 RX ORDER — DICLOFENAC SODIUM 10 MG/G
GEL TOPICAL 4 TIMES DAILY
COMMUNITY

## 2020-08-27 RX ORDER — TRIAMCINOLONE ACETONIDE 40 MG/ML
40 INJECTION, SUSPENSION INTRA-ARTICULAR; INTRAMUSCULAR ONCE
Qty: 1 ML | Refills: 0
Start: 2020-08-27 | End: 2020-08-27

## 2020-08-27 RX ORDER — MELOXICAM 15 MG/1
15 TABLET ORAL
Qty: 30 TAB | Refills: 1 | Status: CANCELLED | OUTPATIENT
Start: 2020-08-27
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
## 2020-09-11 ENCOUNTER — HOSPITAL ENCOUNTER (OUTPATIENT)
Dept: PHYSICAL THERAPY | Age: 60
Discharge: HOME | End: 2020-09-11
Payer: MEDICAID

## 2020-09-11 PROCEDURE — 97161 PT EVAL LOW COMPLEX 20 MIN: CPT

## 2020-09-11 NOTE — PROGRESS NOTES
[facility] [place] [address]         [facility], [facility] [place]   [phone] fax    Plan of Care/ Statement of Necessity for Physical Therapy Services  Patient name: Lida Le Start of Care: 2020   Referral source: Vinayak Mix,* : 1960    Medical Diagnosis: Right hip pain [M25.551]  Payor: [facility] MEDICAID / Plan: [facility] COMMUNITY PLAN [facility] / Product Type: Managed Care Medicaid /  Onset Date: 3 months ago    Treatment Diagnosis: right>left hip pain   Prior Hospitalization: see medical history Provider#: [id number]   Medications: Verified on Patient summary List    Comorbidities: hx cancer (prostate per [facility])   Prior Level of Function: Independent with ADLs and functional tasks with no hip pain prior to onset. The Plan of Care and following information is based on the information from the initial evaluation. Assessment/ key information:   Pt is a 63year old male who presents to therapy today with right>left hip pain. Pt states that his symptoms began about 3 months ago when he woke up with the pain (denies any specific injury/trauma). Pt reports having increased pain with walking, stairs, sitting, and laying on his sides in bed. Pt reports having an injection in his right hip on 2020 and states that this did not help with the pain. Pt demonstrated decreased AROM, decreased strength, muscle tightness, tenderness to palpation, and impaired mobility. Pt would benefit from physical therapy to improve the above impairments to help the pt return to performing ADLs and functional activities.      Evaluation Complexity History MEDIUM  Complexity : 1-2 comorbidities / personal factors will impact the outcome/ POC ; Examination HIGH Complexity : 4+ Standardized tests and measures addressing body structure, function, activity limitation and / or participation in recreation  ;Presentation LOW Complexity : Stable, uncomplicated ;Clinical Decision Making MEDIUM Complexity : FOTO score of 26-74  Overall Complexity Rating: LOW   Problem List: pain affecting function, decrease ROM, decrease strength, edema affecting function, impaired gait/ balance, decrease ADL/ functional abilitiies, decrease activity tolerance, decrease flexibility/ joint mobility and decrease transfer abilities   Treatment Plan may include any combination of the following: Therapeutic exercise, Therapeutic activities, Neuromuscular re-education, Physical agent/modality, Gait/balance training, Manual therapy, Patient education, Self Care training, Functional mobility training, Home safety training and Stair training  Patient / Family readiness to learn indicated by: asking questions, trying to perform skills and interest  Persons(s) to be included in education: patient (P)  Barriers to Learning/Limitations: None  Patient Goal (s): to be in less pain  Patient Self Reported Health Status: fair  Rehabilitation Potential: fair    Short Term Goals: To be accomplished in 2 treatments:  1. Pt will report compliance and independence to [facility] to help the pt manage their pain and symptoms. Eval: established   Long Term Goals: To be accomplished in 10 treatments:  1. Pt will increase FOTO score to 61 points to improve ability to perform ADLs. Eval: 39 points  2. Pt will report an improvement in at best pain to 0/10 to improve ease of sleeping. Eval: 5/10 at best  3. Pt will increase AROM B hip flex to 110 degs, B hip ABD to 45 degs, right hip IR to 35 degs, left hip ER to 20 degs (flex/ABD measured in supine, IR/ER measured in sitting) to improve ability to tolerate stair negotiation. Eval: right hip flex 99 degs, left hip flex 97 degs, right hip ABD 36 degs, left hip ABD 40 degs, right hip IR 28 degs, left hip ER 13 degs (flex/ABD measured in supine, IR/ER measured in sitting)  4.  Pt will report having no difficulty with walking between rooms at home to improve standing and walking tolerance in the home and community. Eval: extreme difficulty or unable to perform per FOTO    Frequency / Duration: Patient to be seen 2 times per week for 10 treatments. Patient/ Caregiver education and instruction: Diagnosis, prognosis, self care, activity modification and exercises   [x]  Plan of care has been reviewed with DAVID Daley, PT 9/11/2020 4:06 PM  _____________________________________________________________________  I certify that the above Therapy Services are being furnished while the patient is under my care. I agree with the treatment plan and certify that this therapy is necessary.     Physician's Signature:____________________  Date:__________Time:______    Please sign and return to [facility] [address], [address] [place]   [phone] fax

## 2020-09-11 NOTE — PROGRESS NOTES
PT DAILY TREATMENT NOTE  10-18    Patient Name: Rufina Espinoza  Date:2020  : 1960  [x]  Patient  Verified  Payor: Bill Galvin / Plan: [facility] COMMUNITY PLAN [employer] / Product Type: Managed Care Medicaid /    In time:3:24  Out time:4:04  Total Treatment Time (min): 40  Visit #: 1 of 10    Treatment Area: Right hip pain [M25.551]    SUBJECTIVE  Pain Level (0-10 scale): 5  Any medication changes, allergies to medications, adverse drug reactions, diagnosis change, or new procedure performed?: [x] No    [] Yes (see summary sheet for update)  Subjective functional status/changes:   [] No changes reported  See POC    OBJECTIVE    18 min [x]Eval                  []Re-Eval     12 min Therapeutic Exercise:  [] See flow sheet : HEP instruction and demonstration   Rationale: increase ROM and increase strength to improve the patients ability to tolerate ADLs    10 min Self Care/Home Management: []  See flow sheet : pt education regarding anatomy and physiology of the LEs and how it relates to the pt's condition, pt education on using ice/heat for 15-20 mins as needed to decrease pain/symptoms (and to not sleep with a hot/cold pack)   Rationale: increase ROM, increase strength and decrease pain/symptoms  to improve the patients ability to tolerate functional tasks. With   [x] TE   [] TA   [] neuro   [x] Other: Self Care/Home management Patient Education: [x] Review HEP    [] Progressed/Changed HEP based on:   [] positioning   [] body mechanics   [] transfers   [] heat/ice application    [] other:      Other Objective/Functional Measures: See evaluation. Pain Level (0-10 scale) post treatment: 5    ASSESSMENT/Changes in Function: Pt given HEP handout to perform. Pt understood exercises in HEP handout. Pt demonstrated decreased AROM, decreased strength, muscle tightness, tenderness to palpation, and impaired mobility.  Pt would benefit from physical therapy to improve the above impairments to help the pt return to performing ADLs and functional activities. Patient will continue to benefit from skilled PT services to modify and progress therapeutic interventions, address functional mobility deficits, address ROM deficits, address strength deficits, analyze and address soft tissue restrictions, analyze and cue movement patterns, analyze and modify body mechanics/ergonomics, assess and modify postural abnormalities, address imbalance/dizziness and instruct in home and community integration to attain remaining goals. [x]  See Plan of Care  []  See progress note/recertification  []  See Discharge Summary         Progress towards goals / Updated goals:  Short Term Goals: To be accomplished in 2 treatments:  1. Pt will report compliance and independence to [facility] to help the pt manage their pain and symptoms. Eval: established   Long Term Goals: To be accomplished in 10 treatments:  1. Pt will increase FOTO score to 61 points to improve ability to perform ADLs. Eval: 39 points  2. Pt will report an improvement in at best pain to 0/10 to improve ease of sleeping. Eval: 5/10 at best  3. Pt will increase AROM B hip flex to 110 degs, B hip ABD to 45 degs, right hip IR to 35 degs, left hip ER to 20 degs (flex/ABD measured in supine, IR/ER measured in sitting) to improve ability to tolerate stair negotiation. Eval: right hip flex 99 degs, left hip flex 97 degs, right hip ABD 36 degs, left hip ABD 40 degs, right hip IR 28 degs, left hip ER 13 degs (flex/ABD measured in supine, IR/ER measured in sitting)  4. Pt will report having no difficulty with walking between rooms at home to improve standing and walking tolerance in the home and community.   Eval: extreme difficulty or unable to perform per FOTO    PLAN  [x]  Upgrade activities as tolerated     [x]  Continue plan of care  [x]  Update interventions per flow sheet       []  Discharge due to:_  []  Other:_      Josetta Alpers, PT 9/11/2020  4:06 PM    No future appointments.

## 2020-09-17 ENCOUNTER — HOSPITAL ENCOUNTER (OUTPATIENT)
Dept: PHYSICAL THERAPY | Age: 60
Discharge: HOME | End: 2020-09-17
Payer: MEDICAID

## 2020-09-22 ENCOUNTER — HOSPITAL ENCOUNTER (OUTPATIENT)
Dept: PHYSICAL THERAPY | Age: 60
Discharge: HOME | End: 2020-09-22
Payer: MEDICAID

## 2020-09-22 PROCEDURE — 97110 THERAPEUTIC EXERCISES: CPT | Performed by: PHYSICAL THERAPIST

## 2020-09-22 PROCEDURE — 97530 THERAPEUTIC ACTIVITIES: CPT | Performed by: PHYSICAL THERAPIST

## 2020-09-22 PROCEDURE — 97112 NEUROMUSCULAR REEDUCATION: CPT | Performed by: PHYSICAL THERAPIST

## 2020-09-22 NOTE — PROGRESS NOTES
PT DAILY TREATMENT NOTE 10-18    Patient Name: Sylwia Capps  Date:2020  : 1960  [x]  Patient  Verified  Payor: [facility] MEDICAID / Plan: [facility] [employer] / Product Type: Managed Care Medicaid /    In time:3:41  Out time:4:41  Total Treatment Time (min): 60  Visit #: 2 of 10    Treatment Area: Right hip pain [M25.551]    SUBJECTIVE  Pain Level (0-10 scale): 5  Any medication changes, allergies to medications, adverse drug reactions, diagnosis change, or new procedure performed?: [x] No    [] Yes (see summary sheet for update)  Subjective functional status/changes:   [] No changes reported  Patient notes pain in the right > left hip. States the pain moves around and can extend down into the anterior thigh. OBJECTIVE    Modality rationale: decrease inflammation and decrease pain to improve the patients ability to increase ease of motion to improve function.    Min Type Additional Details    [] Estim:  []Unatt       []IFC  []Premod                        []Other:  []w/ice   []w/heat  Position:  Location:    [] Estim: []Att    []TENS instruct  []NMES                    []Other:  []w/US   []w/ice   []w/heat  Position:  Location:    []  Traction: [] Cervical       []Lumbar                       [] Prone          []Supine                       []Intermittent   []Continuous Lbs:  [] before manual  [] after manual    []  Ultrasound: []Continuous   [] Pulsed                           []1MHz   []3MHz W/cm2:  Location:    []  Iontophoresis with dexamethasone         Location: [] Take home patch   [] In clinic   10 [x]  Ice     []  heat  []  Ice massage  []  Laser   []  Anodyne Position: supine  Location: right and left hips    []  Laser with stim  []  Other:  Position:  Location:    []  Vasopneumatic Device Pressure:       [] lo [] med [] hi   Temperature: [] lo [] med [] hi   [] Skin assessment post-treatment:  []intact []redness- no adverse reaction    []redness  adverse reaction:     20 min Therapeutic Exercise:  [] See flow sheet :Emphasis on increasing AROM and strength of the LEs. Rationale: increase ROM and increase strength to improve the patients ability to increase his tolerance to all ADLs. 10 min Therapeutic Activity:  []  See flow sheet : Emphasis on activities to improve transfers and mobility. Rationale: increase strength and improve coordination  to improve the patients ability to transfer sit to stand and move from room to room. 20 min Neuromuscular Re-education:  []  See flow sheet : Emphasis on recruitment and activation of quad and gluteal muscles to improve proprioception and kinesthetic awareness. Rationale: increase ROM, increase strength, improve coordination, improve balance and increase proprioception  to improve the patients ability to ambulate safely on all surfaces. With   [] TE   [] TA   [] neuro   [] other: Patient Education: [x] Review HEP    [] Progressed/Changed HEP based on:   [] positioning   [] body mechanics   [] transfers   [] heat/ice application    [] other:      Other Objective/Functional Measures: Gait is without deviation. He moves with guarded posture. Pain Level (0-10 scale) post treatment: 5    ASSESSMENT/Changes in Function: Patient continues with bilateral hip pain, no change noted with therapy treatment today. Patient will continue to benefit from skilled PT services to modify and progress therapeutic interventions, address functional mobility deficits, address ROM deficits, address strength deficits, analyze and address soft tissue restrictions, analyze and cue movement patterns, analyze and modify body mechanics/ergonomics, assess and modify postural abnormalities, address imbalance/dizziness and instruct in home and community integration to attain remaining goals. [x]  See Plan of Care  []  See progress note/recertification  []  See Discharge Summary         Progress towards goals / Updated goals:  Short Term Goals:  To be accomplished in 2 treatments:  1. Pt will report compliance and independence to HEP to help the pt manage their pain and symptoms. Eval: established   Current:  Performing his TE every other day. 9/22/2020. Long Term Goals: To be accomplished in 10 treatments:  1. Pt will increase FOTO score to 61 points to improve ability to perform ADLs. Eval: 39 points  2. Pt will report an improvement in at best pain to 0/10 to improve ease of sleeping. Eval: 5/10 at best  3. Pt will increase AROM B hip flex to 110 degs, B hip ABD to 45 degs, right hip IR to 35 degs, left hip ER to 20 degs (flex/ABD measured in supine, IR/ER measured in sitting) to improve ability to tolerate stair negotiation. Eval: right hip flex 99 degs, left hip flex 97 degs, right hip ABD 36 degs, left hip ABD 40 degs, right hip IR 28 degs, left hip ER 13 degs (flex/ABD measured in supine, IR/ER measured in sitting)  4. Pt will report having no difficulty with walking between rooms at home to improve standing and walking tolerance in the home and community.   Eval: extreme difficulty or unable to perform per FOTO     PLAN  [x]  Upgrade activities as tolerated     [x]  Continue plan of care  []  Update interventions per flow sheet       []  Discharge due to:_  []  Other:_      Hannah Akins, PT 9/22/2020  3:41 PM    Future Appointments   Date Time Provider Eun Su   9/24/2020  3:30 PM Hines Cogan, PT [facility]   9/29/2020  3:30 PM Hines Cogan, PT [facility]

## 2020-09-24 ENCOUNTER — HOSPITAL ENCOUNTER (OUTPATIENT)
Dept: PHYSICAL THERAPY | Age: 60
Discharge: HOME | End: 2020-09-24
Payer: MEDICAID

## 2020-09-24 PROCEDURE — 97112 NEUROMUSCULAR REEDUCATION: CPT | Performed by: PHYSICAL THERAPIST

## 2020-09-24 PROCEDURE — 97110 THERAPEUTIC EXERCISES: CPT | Performed by: PHYSICAL THERAPIST

## 2020-09-24 PROCEDURE — 97530 THERAPEUTIC ACTIVITIES: CPT | Performed by: PHYSICAL THERAPIST

## 2020-09-24 NOTE — PROGRESS NOTES
PT DAILY TREATMENT NOTE 10-18    Patient Name: Martell Puentes  Date:2020  : 1960  [x]  Patient  Verified  Payor: [facility] MEDICAID / Plan: [facility] [employer] / Product Type: Managed Care Medicaid /    In time:3:32  Out time:4:10  Total Treatment Time (min): 38  Visit #: 3 of 10    Treatment Area: Right hip pain [M25.551]    SUBJECTIVE  Pain Level (0-10 scale): 3-4  Any medication changes, allergies to medications, adverse drug reactions, diagnosis change, or new procedure performed?: [x] No    [] Yes (see summary sheet for update)  Subjective functional status/changes:   [] No changes reported  Patient states he is feeling a little better today but still notes some hip pain affecting his activity level. OBJECTIVE      18 min Therapeutic Exercise:  [] See flow sheet :Emphasis on increasing AROM and strength of the LEs. Rationale: increase ROM and increase strength to improve the patients ability to increase his tolerance to all ADLs. 10 min Therapeutic Activity:  []  See flow sheet :Emphasis on activities to improve transfers and mobility. Rationale: increase strength and improve coordination  to improve the patients ability to transfer sit to stand and move from room to room. 10 min Neuromuscular Re-education:  []  See flow sheet :Emphasis on recruitment and activation of quad and gluteal muscles to improve proprioception and kinesthetic awareness. Rationale: increase ROM, increase strength, improve coordination, improve balance and increase proprioception  to improve the patients ability to ambulate safely on all surfaces. With   [] TE   [] TA   [] neuro   [] other: Patient Education: [x] Review HEP    [] Progressed/Changed HEP based on:   [] positioning   [] body mechanics   [] transfers   [] heat/ice application    [] other:      Other Objective/Functional Measures: Patient has stiffness at both hips. Gait is with decreased danelle.      Pain Level (0-10 scale) post treatment: 2    ASSESSMENT/Changes in Function: Patient with decreased subjective c/o pain. Patient will continue to benefit from skilled PT services to modify and progress therapeutic interventions, address functional mobility deficits, address ROM deficits, address strength deficits, analyze and address soft tissue restrictions, analyze and cue movement patterns, analyze and modify body mechanics/ergonomics, assess and modify postural abnormalities, address imbalance/dizziness and instruct in home and community integration to attain remaining goals. []  See Plan of Care  []  See progress note/recertification  []  See Discharge Summary         Progress towards goals / Updated goals:  Short Term Goals: To be accomplished in 2 treatments:  1. Pt will report compliance and independence to HEP to help the pt manage their pain and symptoms.             Eval: established   Current:  Performing his TE every other day. 9/22/2020.      Long Term Goals: To be accomplished in 10 treatments:  1. Pt will increase FOTO score to 61 points to improve ability to perform ADLs. Eval: 39 points  2. Pt will report an improvement in at best pain to 0/10 to improve ease of sleeping. Eval: 5/10 at best  3. Pt will increase AROM B hip flex to 110 degs, B hip ABD to 45 degs, right hip IR to 35 degs, left hip ER to 20 degs (flex/ABD measured in supine, IR/ER measured in sitting) to improve ability to tolerate stair negotiation. Eval: right hip flex 99 degs, left hip flex 97 degs, right hip ABD 36 degs, left hip ABD 40 degs, right hip IR 28 degs, left hip ER 13 degs (flex/ABD measured in supine, IR/ER measured in sitting)  4. Pt will report having no difficulty with walking between rooms at home to improve standing and walking tolerance in the home and community.   Eval: extreme difficulty or unable to perform per FOTO     PLAN  []  Upgrade activities as tolerated     []  Continue plan of care  []  Update interventions per flow sheet       []  Discharge due to:_  []  Other:_      Pete Washington PT 9/24/2020  4:11 PM    Future Appointments   Date Time Provider Eun Su   9/29/2020  3:30 PM Yudith Friedman, PT [facility]

## 2020-09-29 ENCOUNTER — HOSPITAL ENCOUNTER (OUTPATIENT)
Dept: PHYSICAL THERAPY | Age: 60
Discharge: HOME | End: 2020-09-29
Payer: MEDICAID

## 2020-09-29 PROCEDURE — 97530 THERAPEUTIC ACTIVITIES: CPT | Performed by: PHYSICAL THERAPIST

## 2020-09-29 PROCEDURE — 97110 THERAPEUTIC EXERCISES: CPT | Performed by: PHYSICAL THERAPIST

## 2020-09-29 PROCEDURE — 97112 NEUROMUSCULAR REEDUCATION: CPT | Performed by: PHYSICAL THERAPIST

## 2020-09-29 NOTE — PROGRESS NOTES
PT DAILY TREATMENT NOTE 10-18    Patient Name: Varun Otto  Date:2020  : 1960  [x]  Patient  Verified  Payor: Lisa Carl / Plan: [facility] COMMUNITY PLAN [facility] / Product Type: Managed Care Medicaid /    In time: 3:20  Out time:4:05  Total Treatment Time (min): 45  Visit #: 4 of 10    Treatment Area: Right hip pain [M25.551]    SUBJECTIVE  Pain Level (0-10 scale): 4  Any medication changes, allergies to medications, adverse drug reactions, diagnosis change, or new procedure performed?: [x] No    [] Yes (see summary sheet for update)  Subjective functional status/changes:   [] No changes reported  Patient reports that he has worse pain in the morning. He moves around for about an hour before the pain eases up. OBJECTIVE    25 min Therapeutic Exercise:  [] See flow sheet :Emphasis on increasing AROM and strength of the LEs. Manual therapy:  Hip LAD; Inferior glides, posterior glides in supine, grade III, IV   Rationale: increase ROM and increase strength to improve the patients ability to increase his tolerance to all ADLs. 10 min Therapeutic Activity:  []  See flow sheet :Emphasis on activities to improve transfers and mobility. Rationale: increase strength and improve coordination  to improve the patients ability to transfer sit to stand and move from room to room. 10 min Neuromuscular Re-education:  []  See flow sheet [de-identified]Emphasis on recruitment and activation of quad and gluteal muscles to improve proprioception and kinesthetic awareness. Rationale: increase strength, improve coordination, improve balance and increase proprioception  to improve the patients ability to ambulate safely on all surfaces.             With   [] TE   [] TA   [] neuro   [] other: Patient Education: [x] Review HEP    [] Progressed/Changed HEP based on:   [] positioning   [] body mechanics   [] transfers   [] heat/ice application    [] other:      Other Objective/Functional Measures: Gait is with decreased danelle. He has capsular tightness in both hips. Pain Level (0-10 scale) post treatment: 2    ASSESSMENT/Changes in Function: Patient continues with bilateral hip pain. Patient will continue to benefit from skilled PT services to modify and progress therapeutic interventions, address functional mobility deficits, address ROM deficits, address strength deficits, analyze and address soft tissue restrictions, analyze and cue movement patterns, analyze and modify body mechanics/ergonomics, assess and modify postural abnormalities, address imbalance/dizziness and instruct in home and community integration to attain remaining goals. [x]  See Plan of Care  []  See progress note/recertification  []  See Discharge Summary         Progress towards goals / Updated goals:  Short Term Goals: To be accomplished in 2 treatments:  1. Pt will report compliance and independence to HEP to help the pt manage their pain and symptoms.             Eval: established   Current:  Performing his TE every other day.  9/22/2020.      Long Term Goals: To be accomplished in 10 treatments:  1. Pt will increase FOTO score to 61 points to improve ability to perform ADLs. Eval: 39 points  2. Pt will report an improvement in at best pain to 0/10 to improve ease of sleeping. Eval: 5/10 at best  Current:  4-5/10 at best.  9/29/2020. Progressing. 3. Pt will increase AROM B hip flex to 110 degs, B hip ABD to 45 degs, right hip IR to 35 degs, left hip ER to 20 degs (flex/ABD measured in supine, IR/ER measured in sitting) to improve ability to tolerate stair negotiation. Eval: right hip flex 99 degs, left hip flex 97 degs, right hip ABD 36 degs, left hip ABD 40 degs, right hip IR 28 degs, left hip ER 13 degs (flex/ABD measured in supine, IR/ER measured in sitting)  4. Pt will report having no difficulty with walking between rooms at home to improve standing and walking tolerance in the home and community.   Eval: extreme difficulty or unable to perform per FOTO. Current:  Notes severe start up pain in the morning.  9/29/3030. No change.     PLAN  [x]  Upgrade activities as tolerated     [x]  Continue plan of care  []  Update interventions per flow sheet       []  Discharge due to:_  []  Other:_      Kashmir Grant PT 9/29/2020  3:27 PM    Future Appointments   Date Time Provider Eun Su   9/29/2020  3:30 PM Bess Vidales, PT [facility]

## 2020-10-01 ENCOUNTER — HOSPITAL ENCOUNTER (OUTPATIENT)
Dept: PHYSICAL THERAPY | Age: 60
Discharge: HOME | End: 2020-10-01
Payer: MEDICAID

## 2020-10-01 PROCEDURE — 97530 THERAPEUTIC ACTIVITIES: CPT | Performed by: PHYSICAL THERAPIST

## 2020-10-01 PROCEDURE — 97110 THERAPEUTIC EXERCISES: CPT | Performed by: PHYSICAL THERAPIST

## 2020-10-01 PROCEDURE — 97112 NEUROMUSCULAR REEDUCATION: CPT | Performed by: PHYSICAL THERAPIST

## 2020-10-01 NOTE — PROGRESS NOTES
PT DAILY TREATMENT NOTE 10-18    Patient Name: Timo Ortiz  Date:10/1/2020  : 1960  [x]  Patient  Verified  Payor: Kiersten  / Plan: [facility] COMMUNITY PLAN [employer] / Product Type: Managed Care Medicaid /    In time:4:17  Out time:4:53  Total Treatment Time (min): 36  Visit #: 5 of 10    Treatment Area: Right hip pain [M25.551]    SUBJECTIVE  Pain Level (0-10 scale): 3  Any medication changes, allergies to medications, adverse drug reactions, diagnosis change, or new procedure performed?: [x] No    [] Yes (see summary sheet for update)  Subjective functional status/changes:   [] No changes reported  Patient reports he feels his hips are doing a little better today. OBJECTIVE      16 min Therapeutic Exercise:  []? See flow sheet :Emphasis on increasing AROM and strength of the LEs. Manual therapy:  Hip LAD; Inferior glides, posterior glides in supine, grade III, IV   Rationale: increase ROM and increase strength to improve the patients ability to increase his tolerance to all ADLs.    10 min Therapeutic Activity:  []? See flow sheet :Emphasis on activities to improve transfers and mobility. Rationale: increase strength and improve coordination  to improve the patients ability to transfer sit to stand and move from room to room. 10 min Neuromuscular Re-education:  []? See flow sheet [de-identified]Emphasis on recruitment and activation of quad and gluteal muscles to improve proprioception and kinesthetic awareness. Rationale: increase strength, improve coordination, improve balance and increase proprioception  to improve the patients ability to ambulate safely on all surfaces.             With   [] TE   [] TA   [] neuro   [] other: Patient Education: [x] Review HEP    [] Progressed/Changed HEP based on:   [] positioning   [] body mechanics   [] transfers   [] heat/ice application    [] other:      Other Objective/Functional Measures: Gait is with decreased danelle.   He has tightness in both hips with flex and abd. Pain Level (0-10 scale) post treatment: 3    ASSESSMENT/Changes in Function: Patient continues with bilateral hip discomfort affecting his mobility. Patient will continue to benefit from skilled PT services to modify and progress therapeutic interventions, address functional mobility deficits, address ROM deficits, address strength deficits, analyze and address soft tissue restrictions, analyze and cue movement patterns, analyze and modify body mechanics/ergonomics, assess and modify postural abnormalities, address imbalance/dizziness and instruct in home and community integration to attain remaining goals.      [x]  See Plan of Care  []  See progress note/recertification  []  See Discharge Summary         Progress towards goals / Updated goals:  Short Term Goals: To be accomplished in 2 treatments:  1. Pt will report compliance and independence to [facility] to help the pt manage their pain and symptoms.             Eval: established   Current:  Performing his TE every other day.  9/22/2020.      Long Term Goals: To be accomplished in 10 treatments:  1. Pt will increase FOTO score to 61 points to improve ability to perform ADLs. Eval: 39 points  2. Pt will report an improvement in at best pain to 0/10 to improve ease of sleeping. Eval: 5/10 at best  Current:  4-5/10 at best.  9/29/2020. Progressing. 3. Pt will increase AROM B hip flex to 110 degs, B hip ABD to 45 degs, right hip IR to 35 degs, left hip ER to 20 degs (flex/ABD measured in supine, IR/ER measured in sitting) to improve ability to tolerate stair negotiation. Eval: right hip flex 99 degs, left hip flex 97 degs, right hip ABD 36 degs, left hip ABD 40 degs, right hip IR 28 degs, left hip ER 13 degs (flex/ABD measured in supine, IR/ER measured in sitting)  4. Pt will report having no difficulty with walking between rooms at home to improve standing and walking tolerance in the home and community.   Eval: extreme difficulty or unable to perform per FOTO. Current:  Notes severe start up pain in the morning.  9/29/3030.   No change.     PLAN  [x]  Upgrade activities as tolerated     [x]  Continue plan of care  []  Update interventions per flow sheet       []  Discharge due to:_  []  Other:_      Monica Aguila, PT 10/1/2020  4:53 PM    Future Appointments   Date Time Provider Eun Sharlene   10/5/2020  4:15 PM Oneil Delgado, PT [facility] [address]   10/8/2020  4:15 PM Weston Castrejon, PTA [facility] [address]   10/12/2020  2:45 PM Errol Mckeon, PT [facility] [address]

## 2020-10-09 ENCOUNTER — TELEPHONE (OUTPATIENT)
Dept: ORTHOPEDIC SURGERY | Age: 60
End: 2020-10-09

## 2020-10-09 DIAGNOSIS — M70.61 TROCHANTERIC BURSITIS OF RIGHT HIP: Primary | ICD-10-CM

## 2020-10-09 RX ORDER — LIDOCAINE 50 MG/G
1 PATCH TOPICAL EVERY 24 HOURS
Qty: 30 PATCH | Refills: 2 | Status: SHIPPED | OUTPATIENT
Start: 2020-10-09

## 2020-10-09 NOTE — TELEPHONE ENCOUNTER
Tried to call patient and it states this person cannot accept calls right now. If he calls back, please give him the information below.

## 2020-10-09 NOTE — TELEPHONE ENCOUNTER
Patient is requesting a new medication for hip pain. The injection he received at office visit in August was not effective. Please call in to [facility] on file and advise patient once done, [phone].

## 2022-03-19 PROBLEM — R06.02 SHORTNESS OF BREATH: Status: ACTIVE | Noted: 2018-08-30

## 2022-03-19 PROBLEM — R07.9 CHEST PAIN: Status: ACTIVE | Noted: 2018-08-30

## 2022-03-20 PROBLEM — F17.200 SMOKER: Status: ACTIVE | Noted: 2018-08-30

## 2022-03-20 PROBLEM — E78.5 DYSLIPIDEMIA: Status: ACTIVE | Noted: 2018-08-30

## 2022-05-11 ENCOUNTER — OFFICE VISIT (OUTPATIENT)
Dept: CARDIOLOGY CLINIC | Age: 62
End: 2022-05-11
Payer: MEDICAID

## 2022-05-11 VITALS
HEIGHT: 72 IN | DIASTOLIC BLOOD PRESSURE: 62 MMHG | BODY MASS INDEX: 20.05 KG/M2 | WEIGHT: 148 LBS | HEART RATE: 90 BPM | OXYGEN SATURATION: 98 % | SYSTOLIC BLOOD PRESSURE: 118 MMHG

## 2022-05-11 DIAGNOSIS — J44.9 CHRONIC OBSTRUCTIVE PULMONARY DISEASE, UNSPECIFIED COPD TYPE (HCC): ICD-10-CM

## 2022-05-11 DIAGNOSIS — R55 SYNCOPE, UNSPECIFIED SYNCOPE TYPE: Primary | ICD-10-CM

## 2022-05-11 DIAGNOSIS — F10.11 H/O ETOH ABUSE: ICD-10-CM

## 2022-05-11 DIAGNOSIS — E78.5 DYSLIPIDEMIA: ICD-10-CM

## 2022-05-11 PROCEDURE — 99204 OFFICE O/P NEW MOD 45 MIN: CPT | Performed by: INTERNAL MEDICINE

## 2022-05-11 RX ORDER — SIMVASTATIN 10 MG/1
TABLET, FILM COATED ORAL
COMMUNITY
Start: 2022-04-04

## 2022-05-11 RX ORDER — LANOLIN ALCOHOL/MO/W.PET/CERES
1000 CREAM (GRAM) TOPICAL DAILY
COMMUNITY
Start: 2022-04-05

## 2022-05-11 RX ORDER — LANOLIN ALCOHOL/MO/W.PET/CERES
325 CREAM (GRAM) TOPICAL DAILY
COMMUNITY
Start: 2021-07-08

## 2022-05-11 RX ORDER — ALBUTEROL SULFATE 90 UG/1
2 AEROSOL, METERED RESPIRATORY (INHALATION)
COMMUNITY
Start: 2021-07-08

## 2022-05-11 RX ORDER — MV-MIN/FOLIC/K1/LYCOPEN/LUTEIN 300-60 MCG
1 TABLET ORAL DAILY
COMMUNITY
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
## 2022-05-12 ENCOUNTER — TELEPHONE (OUTPATIENT)
Dept: CARDIOLOGY CLINIC | Age: 62
End: 2022-05-12

## 2022-05-12 NOTE — LETTER
2022 8:56 AM    Mr. Ziyad Mariscal  [address] [facility]        Dear Dr. Saeed Adams:    Re: Ziyad Mariscal   : 1960     Mr. Batsheva Weaver is cleared from a cardiac standpoint with low risk for hip surgery scheduled on 22. If you have any questions or any further assistance is needed please contact our office.     Sincerely,             Carl Cohn MD    cc:  Vaughn Lauren MD

## 2022-05-12 NOTE — LETTER
2022 11:04 AM    Mr. Michel Ly  [address] [facility]        Dear Dr. Griffith Ng :    Re: Michel Ly   : 1960     Mr. Sapphire Reyes is cleared from a cardiac standpoint with low risk for hip surgery scheduled on 2022. If you have any questions or any further assistance is needed please contact our office.     Sincerely,           Jareth Montilla MD    cc:  Krystyna Ackerman MD

## 2023-05-11 ENCOUNTER — OFFICE VISIT (OUTPATIENT)
Age: 63
End: 2023-05-11
Payer: MEDICAID

## 2023-05-11 VITALS
HEART RATE: 93 BPM | HEIGHT: 71 IN | WEIGHT: 153 LBS | OXYGEN SATURATION: 100 % | DIASTOLIC BLOOD PRESSURE: 54 MMHG | BODY MASS INDEX: 21.42 KG/M2 | SYSTOLIC BLOOD PRESSURE: 103 MMHG

## 2023-05-11 DIAGNOSIS — F10.11 ALCOHOL ABUSE, IN REMISSION: ICD-10-CM

## 2023-05-11 DIAGNOSIS — R55 SYNCOPE AND COLLAPSE: Primary | ICD-10-CM

## 2023-05-11 DIAGNOSIS — E78.00 HYPERCHOLESTEREMIA: ICD-10-CM

## 2023-05-11 DIAGNOSIS — Z71.6 TOBACCO ABUSE COUNSELING: ICD-10-CM

## 2023-05-11 PROCEDURE — 99213 OFFICE O/P EST LOW 20 MIN: CPT | Performed by: INTERNAL MEDICINE

## 2023-05-11 ASSESSMENT — PATIENT HEALTH QUESTIONNAIRE - PHQ9
SUM OF ALL RESPONSES TO PHQ QUESTIONS 1-9: 0
1. LITTLE INTEREST OR PLEASURE IN DOING THINGS: 0
SUM OF ALL RESPONSES TO PHQ9 QUESTIONS 1 & 2: 0
SUM OF ALL RESPONSES TO PHQ QUESTIONS 1-9: 0
2. FEELING DOWN, DEPRESSED OR HOPELESS: 0

## 2023-05-11 ASSESSMENT — ENCOUNTER SYMPTOMS
GASTROINTESTINAL NEGATIVE: 1
RESPIRATORY NEGATIVE: 1
EYES NEGATIVE: 1

## 2023-05-11 NOTE — PROGRESS NOTES
1. Have you been to the ER, urgent care clinic since your last visit? Hospitalized since your last visit? Yes When: last month  Where: [facility] Reason for visit: pancreas      2. Where do you normally have your labs drawn?  [facility]     3. Do you need any refills today?   no    4. Which local pharmacy do you use (enter pharmacy)? [facility]    5. Which mail order pharmacy do you use (enter pharmacy)?   no     6. Are you here for surgical clearance and if so who will be doing your     procedure/surgery (care team)?    no

## 2023-05-11 NOTE — PROGRESS NOTES
Frances Fisher is a 58y.o. year old male. Follow-up of syncope, hyperlipidemia, alcohol abuse-quit 4/33  History of COPD    5/11/2023 No significant chest pain, shortness of breath, edema, dizziness, palpitations or loss of consciousness. Feels a lot better since he stopped drinking alcohol. Review of Systems   Constitutional: Negative. HENT: Negative. Eyes: Negative. Respiratory: Negative. Cardiovascular: Negative. Gastrointestinal: Negative. Endocrine: Negative. Genitourinary: Negative. Musculoskeletal: Negative. Neurological: Negative. Psychiatric/Behavioral: Negative. All other systems reviewed and are negative. Physical Exam  Vitals and nursing note reviewed. Constitutional:       Appearance: Normal appearance. HENT:      Head: Normocephalic and atraumatic. Nose: Nose normal.   Eyes:      Conjunctiva/sclera: Conjunctivae normal.   Cardiovascular:      Rate and Rhythm: Normal rate and regular rhythm. Pulses: Normal pulses. Heart sounds: Normal heart sounds. Pulmonary:      Effort: Pulmonary effort is normal.      Breath sounds: Normal breath sounds. Abdominal:      General: Bowel sounds are normal.      Palpations: Abdomen is soft. Musculoskeletal:         General: Normal range of motion. Right lower leg: No edema. Left lower leg: No edema. Skin:     General: Skin is warm and dry. Neurological:      General: No focal deficit present. Mental Status: He is alert and oriented to person, place, and time. Psychiatric:         Mood and Affect: Mood normal.         Behavior: Behavior normal.    No Known Allergies    Past Medical History:   Diagnosis Date    CHF (congestive heart failure) (HCC)     COPD (chronic obstructive pulmonary disease) (HCC)     Elevated PSA     Heart abnormality     Hypercholesterolemia     Panlobular emphysema (HCC)     Prostate CA (HCC)     Gwendolyn 7 (3+4). Pre-tx PSA 11.3.      SBO (small bowel

## 2024-12-23 NOTE — PROGRESS NOTES
Mr. Db Magdaleno has a reminder for a \"due or due soon\" health maintenance. I have asked that he contact his primary care provider for follow-up on this health maintenance. No